# Patient Record
Sex: FEMALE | Race: WHITE | Employment: UNEMPLOYED | URBAN - METROPOLITAN AREA
[De-identification: names, ages, dates, MRNs, and addresses within clinical notes are randomized per-mention and may not be internally consistent; named-entity substitution may affect disease eponyms.]

---
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
## 2024-05-22 ENCOUNTER — TELEPHONE (OUTPATIENT)
Dept: PEDIATRICS | Facility: CLINIC | Age: 19
End: 2024-05-22

## 2024-05-22 NOTE — TELEPHONE ENCOUNTER
Outgoing call spoke to mom (Irlanda).    Scheduled for June 10th with PCP.    Thank you kindly,  Kenneth ROBERTSON

## 2024-05-22 NOTE — TELEPHONE ENCOUNTER
Reason for Call:  Appointment Request    Patient requesting this type of appt:  Preventive     Requested provider: Rosalind Rivera    Reason patient unable to be scheduled: Not with their preferred provider    When does patient want to be seen/preferred time:  Provider appts aren't showing up until Feb 2025, pt would like providers soonest availability     Comments: Pt's mom requesting sooner appt to reschedule than what's avail for  to schedule for     Okay to leave a detailed message?: Yes at Home number on file [phone] (home)    Call taken on 5/22/2024 at 7:55 AM by Romi Sterling